# Patient Record
Sex: MALE | Race: WHITE | NOT HISPANIC OR LATINO | Employment: FULL TIME | URBAN - METROPOLITAN AREA
[De-identification: names, ages, dates, MRNs, and addresses within clinical notes are randomized per-mention and may not be internally consistent; named-entity substitution may affect disease eponyms.]

---

## 2021-01-18 ENCOUNTER — TELEPHONE (OUTPATIENT)
Dept: ORTHOPEDIC SURGERY | Facility: CLINIC | Age: 51
End: 2021-01-18

## 2021-01-18 NOTE — TELEPHONE ENCOUNTER
LEFT VOICEMAIL WITH PATIENT TO GET SCHEDULED ON A Friday WITH ELVIRA MAYA PER DR. BOCANEGRA. SHE HAS REVIEWED A LETTER FROM PATIENT'S PHYSICAL THERAPIST AND WOULD LIKE PATIENT TO COME IN FOR A NEW PATIENT APPOINTMENT.

## 2021-02-12 ENCOUNTER — OFFICE VISIT (OUTPATIENT)
Dept: ORTHOPEDIC SURGERY | Facility: CLINIC | Age: 51
End: 2021-02-12

## 2021-02-12 VITALS — BODY MASS INDEX: 30.69 KG/M2 | HEART RATE: 70 BPM | OXYGEN SATURATION: 99 % | WEIGHT: 214.4 LBS | HEIGHT: 70 IN

## 2021-02-12 DIAGNOSIS — M76.62 TENDONITIS, ACHILLES, LEFT: ICD-10-CM

## 2021-02-12 DIAGNOSIS — M79.672 LEFT FOOT PAIN: Primary | ICD-10-CM

## 2021-02-12 DIAGNOSIS — M92.62 HAGLUND'S DEFORMITY OF LEFT HEEL: ICD-10-CM

## 2021-02-12 PROCEDURE — 99204 OFFICE O/P NEW MOD 45 MIN: CPT | Performed by: PHYSICIAN ASSISTANT

## 2021-02-12 RX ORDER — DIPHENOXYLATE HYDROCHLORIDE AND ATROPINE SULFATE 2.5; .025 MG/1; MG/1
TABLET ORAL DAILY
COMMUNITY

## 2021-02-12 NOTE — PROGRESS NOTES
[facility] [place] [facility]opa[facility] Surgery Clinic Note    Subjective     Patient: Kurt Schilder  : 1970    Primary Care Provider: Roger Neil DO    Requesting Provider: As above    Pain of the Left Foot      History    Chief Complaint: Left Achilles pain    History of Present Illness: This is a very pleasant 50-year-old male presenting to discuss his left heel pain.  He reports the pain began in the spring 2020.  He has a large pump bump which is been there for years but prior did not cause him any pain.  He complains of pain with weightbearing and walking that is 5/10 and aching and dull.  He is a triathlete and is having difficulty and is unable to run at this time secondary to the pain.  He can swim and do the bike.  He tends to wear a hole through the back of his shoe over the pump bump at the insertion of the Achilles.  He has had no trauma or injury.  He was treated with physical therapy and anti-inflammatories.  He is a physical therapist himself.  He is here for further evaluation and treatment recommendations.    Current Outpatient Medications on File Prior to Visit   Medication Sig Dispense Refill   • Cholecalciferol (VITAMIN D-3 PO) Take  by mouth.     • CITRUS BERGAMOT PO Take  by mouth.     • Ferrous Sulfate (IRON PO) Take  by mouth.     • IBUPROFEN PO Take  by mouth.     • MAGNESIUM PO Take  by mouth.     • multivitamin (MULTIVITAMIN PO) Take  by mouth Daily.     • Red Yeast Rice Extract (RED YEAST RICE PO) Take  by mouth.       No current facility-administered medications on file prior to visit.       No Known Allergies   Past Medical History:   Diagnosis Date   • Anemia    • Asthma    • Headache    • Skin cancer, basal cell     forehead     Past Surgical History:   Procedure Laterality Date   • APPENDECTOMY     • MOHS SURGERY     • TESTICLE TORSION REPAIR       Family History   Problem Relation Age of Onset   • Arthritis Father    • Cancer Father       Social History  "    Socioeconomic History   • Marital status:      Spouse name: Not on file   • Number of children: Not on file   • Years of education: Not on file   • Highest education level: Not on file   Tobacco Use   • Smoking status: Never Smoker   • Smokeless tobacco: Never Used   Substance and Sexual Activity   • Alcohol use: Yes     Comment: 2-3 drinks yearly   • Drug use: Never   • Sexual activity: Defer        Review of Systems   Constitutional: Negative.    HENT: Negative.    Eyes: Negative.    Respiratory: Negative.    Cardiovascular: Negative.    Gastrointestinal: Negative.    Endocrine: Negative.    Genitourinary: Negative.    Musculoskeletal: Positive for arthralgias.   Skin: Negative.    Allergic/Immunologic: Negative.    Neurological: Negative.    Hematological: Negative.    Psychiatric/Behavioral: Negative.        The following portions of the patient's history were reviewed and updated as appropriate: allergies, current medications, past family history, past medical history, past social history, past surgical history and problem list.      Objective      Physical Exam  Pulse 70   Ht 178.3 cm (70.2\")   Wt 97.3 kg (214 lb 6.4 oz)   SpO2 99%   BMI 30.59 kg/m²     Body mass index is 30.59 kg/m².    GENERAL: Body habitus: obese    Lower extremity edema: Left: trace; Right: trace    Varicose veins:  Left: mild; Right: mild    Gait: normal     Mental Status:  awake and alert; oriented to person, place, and time    Voice:  clear  SKIN:  Normal    Hair Growth:  Right:normal; Left:  normal  HEENT: Head: Normocephalic, atraumatic,  without obvious abnormality.  eye: normal external eye, no icterus   PULM:  Repiratory effort normal    Ortho Exam   V:  Dorsalis Pedis:  Right: 2+; Left:2+    Posterior Tibial: Right:2+; Left:2+    Capillary Refill:  Brisk  MSK:      Tibia:  Right:  non tender; Left:  non tender      Ankle:  Right: non tender, ROM  normal and motor function  normal; Left:  Patient has a large pump " bump with no redness, warmth.  Nontender      Foot:  Right:  non tender; Left:  non tender      NEURO: Heel Walking:  Right:  normal; Left:  normal    Toe Walking:  Right:  normal; Left:  normal     W[place]all-Malia 5.07 monofilament test: not evaluated    Lower extremity sensation: intact     Calf Atrophy:none    Motor Function: all 5/5          Medical Decision Making    Data Review:   ordered and reviewed x-rays today    Assessment:  1. Left foot pain    2. Tendonitis, Achilles, left    3. Reuben's deformity of left heel        Plan:  Left achilles tendonitis with Reuben's deformity.  Reviewed today's x-rays and clinical findings past and current treatment the patient.  X-rays today show normal alignment, normal joint spaces, slight bulge over Achilles tendon about 4 cm above the insertion, no comparison.  It is difficult to tell if his pain is coming from tendinitis or from pressure over the deformity from a shoe.  Recommendation today is MRI of the left ankle for further evaluation.  He will return following the MRI for further treatment recommendations.    Patient history, diagnosis and treatment plan discussed with Dr. Kenny.        Roya Mckeon PA-C  02/17/21  12:49 EST

## 2021-02-27 ENCOUNTER — HOSPITAL ENCOUNTER (OUTPATIENT)
Dept: MRI IMAGING | Facility: HOSPITAL | Age: 51
Discharge: HOME | End: 2021-02-27
Admitting: PHYSICIAN ASSISTANT

## 2021-02-27 DIAGNOSIS — M79.672 LEFT FOOT PAIN: ICD-10-CM

## 2021-02-27 DIAGNOSIS — M76.62 TENDONITIS, ACHILLES, LEFT: ICD-10-CM

## 2021-02-27 PROCEDURE — 73721 MRI JNT OF LWR EXTRE W/O DYE: CPT

## 2021-03-12 ENCOUNTER — OFFICE VISIT (OUTPATIENT)
Dept: ORTHOPEDIC SURGERY | Facility: CLINIC | Age: 51
End: 2021-03-12

## 2021-03-12 VITALS
DIASTOLIC BLOOD PRESSURE: 81 MMHG | WEIGHT: 205.03 LBS | SYSTOLIC BLOOD PRESSURE: 138 MMHG | HEIGHT: 70 IN | HEART RATE: 55 BPM | BODY MASS INDEX: 29.35 KG/M2

## 2021-03-12 DIAGNOSIS — M92.62 HAGLUND'S DEFORMITY OF LEFT HEEL: Primary | ICD-10-CM

## 2021-03-12 DIAGNOSIS — M76.62 TENDONITIS, ACHILLES, LEFT: ICD-10-CM

## 2021-03-12 PROCEDURE — 99213 OFFICE O/P EST LOW 20 MIN: CPT | Performed by: PHYSICIAN ASSISTANT

## 2021-03-12 RX ORDER — METHYLPREDNISOLONE 4 MG/1
TABLET ORAL
Qty: 21 TABLET | Refills: 0 | Status: SHIPPED | OUTPATIENT
Start: 2021-03-12 | End: 2022-06-01

## 2021-03-12 NOTE — PROGRESS NOTES
[facility] [place] Orthopaedic Surgery Clinic Note    Subjective     Patient: Kurt Joseph Schilder  : 1970    Primary Care Provider: Roger Neil DO    Requesting Provider: As above    Follow-up (MRI Left ankle 21)      History    Chief Complaint: Follow-up MRI left ankle    History of Present Illness: Patient returns today for follow-up visit left ankle MRI.  He has gotten clogs which seem to help some.  However, he is not a fan of them.  He has continued with his low impact exercise has not returned to running.    Current Outpatient Medications on File Prior to Visit   Medication Sig Dispense Refill   • Cholecalciferol (VITAMIN D-3 PO) Take  by mouth.     • CITRUS BERGAMOT PO Take  by mouth.     • Ferrous Sulfate (IRON PO) Take  by mouth.     • IBUPROFEN PO Take  by mouth.     • multivitamin (MULTIVITAMIN PO) Take  by mouth Daily.     • Red Yeast Rice Extract (RED YEAST RICE PO) Take  by mouth.       No current facility-administered medications on file prior to visit.      No Known Allergies   Past Medical History:   Diagnosis Date   • Anemia    • Asthma    • Headache    • Skin cancer, basal cell     forehead     Past Surgical History:   Procedure Laterality Date   • APPENDECTOMY     • MOHS SURGERY     • TESTICLE TORSION REPAIR       Family History   Problem Relation Age of Onset   • Arthritis Father    • Cancer Father       Social History     Socioeconomic History   • Marital status:      Spouse name: Not on file   • Number of children: Not on file   • Years of education: Not on file   • Highest education level: Not on file   Tobacco Use   • Smoking status: Never Smoker   • Smokeless tobacco: Never Used   Substance and Sexual Activity   • Alcohol use: Yes     Comment: 2-3 drinks yearly   • Drug use: Never   • Sexual activity: Defer        Review of Systems   Constitutional: Negative.    HENT: Negative.    Eyes: Negative.    Respiratory: Negative.    Cardiovascular: Negative.   "  Gastrointestinal: Negative.    Endocrine: Negative.    Genitourinary: Negative.    Musculoskeletal: Positive for arthralgias.   Skin: Negative.    Allergic/Immunologic: Negative.    Neurological: Negative.    Hematological: Negative.    Psychiatric/Behavioral: Negative.        The following portions of the patient's history were reviewed and updated as appropriate: allergies, current medications, past family history, past medical history, past social history, past surgical history and problem list.      Objective      Physical Exam  /81   Pulse 55   Ht 177.8 cm (70\")   Wt 93 kg (205 lb 0.4 oz)   BMI 29.42 kg/m²     Body mass index is 29.42 kg/m².    Patient is well developed, well nourished and in no acute distress.  Alert and oriented x 3.    Ortho Exam  Left ankle exam: Nontender to palpation over prominent pump bump.  Normal range of motion and strength.  Neurovascular intact.  Normal gait.    Medical Decision Making    Data Review:   reviewed radiology images    Assessment:  1. Reuben's deformity of left heel    2. Tendonitis, Achilles, left        Plan:  Mild left Achilles tendinitis with Reuben's deformity.  I reviewed MRI from 2/27/2021 and clinical findings past and current treatment the patient.  MRI shows no significant tendinitis with a small amount of fluid at the retrocalcaneal bursa.  We discussed continued treatment options including continued stretching, open back shoes.  He is a physical therapist himself.  I do not see any strong indication for putting him in a cast for 6 weeks.  He would rather not do that as well.  He will slowly return to activity when comfortable.  Return as needed.    History, diagnosis and treatment plan discussed with Dr. Arteaga.          Roya Mckeon PA-C  03/16/21  14:10 EDT    "

## 2022-06-01 ENCOUNTER — OFFICE VISIT (OUTPATIENT)
Dept: CARDIOLOGY | Facility: CLINIC | Age: 52
End: 2022-06-01

## 2022-06-01 VITALS
HEIGHT: 70 IN | BODY MASS INDEX: 30.92 KG/M2 | OXYGEN SATURATION: 99 % | DIASTOLIC BLOOD PRESSURE: 82 MMHG | HEART RATE: 55 BPM | SYSTOLIC BLOOD PRESSURE: 150 MMHG | WEIGHT: 216 LBS

## 2022-06-01 DIAGNOSIS — R00.1 BRADYCARDIA, SINUS: Primary | ICD-10-CM

## 2022-06-01 PROCEDURE — 99203 OFFICE O/P NEW LOW 30 MIN: CPT | Performed by: INTERNAL MEDICINE

## 2022-06-01 PROCEDURE — 93000 ELECTROCARDIOGRAM COMPLETE: CPT | Performed by: INTERNAL MEDICINE

## 2022-06-01 RX ORDER — FEXOFENADINE HCL 180 MG/1
180 TABLET ORAL DAILY PRN
COMMUNITY

## 2022-06-01 RX ORDER — FLUTICASONE PROPIONATE 50 MCG
1 SPRAY, SUSPENSION (ML) NASAL DAILY
COMMUNITY

## 2022-06-01 NOTE — PROGRESS NOTES
"Subjective:     Encounter Date:06/01/2022    Primary Care Physician: Roger Neil DO      Patient ID: Kurt Joseph Schilder is a 52 y.o. male.    Chief Complaint:Slow Heart Rate    PROBLEM LIST:  1. Bradycardia  2. Dyslipidemia  3. Basal cell cancer with removal  4. History of nephrolithiasis.   5. Celiac disease  6. History of iron deficiency  7. Surgeries:  a. Skin cancer removal  b. Appendectomy  c. Surgery for testicular torsion 1974      No Known Allergies      Current Outpatient Medications:   •  Cholecalciferol (VITAMIN D-3 PO), Take  by mouth., Disp: , Rfl:   •  CITRUS BERGAMOT PO, Take  by mouth., Disp: , Rfl:   •  Ferrous Sulfate (IRON PO), Take  by mouth., Disp: , Rfl:   •  fexofenadine (ALLEGRA) 180 MG tablet, Take 180 mg by mouth Daily As Needed., Disp: , Rfl:   •  fluticasone (FLONASE) 50 MCG/ACT nasal spray, 1 spray into the nostril(s) as directed by provider Daily., Disp: , Rfl:   •  IBUPROFEN PO, Take  by mouth., Disp: , Rfl:   •  multivitamin (THERAGRAN) tablet tablet, Take  by mouth Daily., Disp: , Rfl:         History of Present Illness    Patient is a 52-year-old  male who we are seeing today for further evaluation of bradycardia.  Patient has no previous history of any cardiac issues.  Has not had a previous cardiac evaluation.  Patient notes that he has been having fatigue for \"years\".  He is underwent sleep study which was negative.  Was noted to be iron deficient at 1 point in time but then was noted to have celiac disease.  Since avoiding gluten this has improved.  Patient has noted recently his resting heart rate has been in the 40s with an average of 47.  And given his general fatigue he wanted to make sure that everything was \"okay\".  Has some occasional musculoskeletal left-sided chest pain.  No anginal symptoms.  He is overall very active and runs 3-4 times weekly as well as weight training.  No reported anginal symptoms.  Has some occasional palpitations maybe " "twice weekly that are described as brief racing sensations.  Does not note any associated tachycardia by his watch.    The following portions of the patient's history were reviewed and updated as appropriate: allergies, current medications, past family history, past medical history, past social history, past surgical history and problem list.    Family History   Problem Relation Age of Onset   • Hypertension Mother    • Arthritis Father    • Cancer Father        Social History     Tobacco Use   • Smoking status: Never Smoker   • Smokeless tobacco: Never Used   Substance Use Topics   • Alcohol use: Yes     Comment: 2-3 drinks yearly   • Drug use: Never         Review of Systems   Constitutional: Positive for malaise/fatigue. Negative for fever.   HENT: Positive for tinnitus. Negative for nosebleeds.    Eyes: Negative for redness and visual disturbance.   Cardiovascular: Positive for palpitations. Negative for orthopnea and paroxysmal nocturnal dyspnea.   Respiratory: Negative for cough, snoring, sputum production and wheezing.    Hematologic/Lymphatic: Negative for bleeding problem.   Skin: Negative for flushing, itching and rash.   Musculoskeletal: Negative for falls, joint pain and muscle cramps.   Gastrointestinal: Negative for abdominal pain, diarrhea, heartburn, nausea and vomiting.   Genitourinary: Negative for hematuria.   Neurological: Positive for excessive daytime sleepiness and headaches. Negative for dizziness, tremors and weakness.   Psychiatric/Behavioral: Negative for substance abuse. The patient is not nervous/anxious.           Objective:   /82   Pulse 55   Ht 177.8 cm (70\")   Wt 98 kg (216 lb)   SpO2 99%   BMI 30.99 kg/m²         Vitals reviewed.   Constitutional:       Appearance: Healthy appearance. Well-developed and not in distress.   Eyes:      Conjunctiva/sclera: Conjunctivae normal.      Pupils: Pupils are equal, round, and reactive to light.   HENT:      Head: Normocephalic and " atraumatic.    Mouth/Throat:      Pharynx: Oropharynx is clear.   Neck:      Thyroid: Thyroid normal. No thyromegaly.      Vascular: Normal carotid pulses. No carotid bruit or JVD. JVD normal.      Lymphadenopathy: No cervical adenopathy.   Pulmonary:      Effort: No respiratory distress.      Breath sounds: No wheezing. No rales.   Chest:      Chest wall: Not tender to palpatation.   Cardiovascular:      Normal rate. Regular rhythm.      No gallop.   Pulses:     Carotid: 2+ bilaterally.     Dorsalis pedis: 2+ bilaterally.     Posterior tibial: 2+ bilaterally.  Abdominal:      General: There is no distension or abdominal bruit.      Palpations: There is no abdominal mass.      Tenderness: There is no abdominal tenderness. There is no rebound.   Musculoskeletal:         General: No tenderness or deformity.      Extremities: No clubbing present.Skin:     General: Skin is warm and dry. There is no cyanosis.      Findings: No rash.   Neurological:      Mental Status: Alert, oriented to person, place, and time and oriented to person, place and time.           ECG 12 Lead    Date/Time: 6/1/2022 3:53 PM  Performed by: Timbo Case MD  Authorized by: Timbo Case MD   Comparison: compared with previous ECG from 4/1/2022  Similar to previous ECG  Rhythm: sinus rhythm  Conduction: right bundle branch block and 1st degree AV block    Clinical impression: abnormal EKG                  Assessment:   Assessment & Plan      Diagnoses and all orders for this visit:    1. Bradycardia, sinus (Primary)    Other orders  -     ECG 12 Lead      1.  Sinus bradycardia.  No evidence of chronotropic incompetence, patient very active and heart rate increases appropriately with exercise.  2.  Dizziness/orthostasis.  Not related to the above.  Malaise fatigue not related to the above.    Recommendations:    1.  Discussed patient, he has no evidence of sinus node dysfunction.  He does have resting bradycardia likely due to being  "\"fit\".  2.  Continue current physical activity.  3.  Reassurance.  No current indications for pacing.  4.  Revisit as needed       Jocelyne SHANE scribed portions of this dictation for Dr. Timbo Case.     I have seen and examined the patient, I have reviewed the note, discussed the case with the advance practice clinician, made necessary changes and I agree with the final note.    Timbo Case MD  06/01/22  15:56 EDT        Dictated utilizing Dragon dictation  "

## 2024-06-04 ENCOUNTER — TELEPHONE (OUTPATIENT)
Dept: CARDIOLOGY | Facility: CLINIC | Age: 54
End: 2024-06-04
Payer: COMMERCIAL

## 2024-06-04 NOTE — TELEPHONE ENCOUNTER
Caller: Schilder, Kurt Joseph    Relationship to patient: Self    Best call back number: [phone]    Chief complaint: WOULD LIKE TO GET IN FOR ROUTINE FOLLOW UP IN [place]    Type of visit: YEARLY FU    Requested date: NEXT AVAILABLE     If rescheduling, when is the original appointment: N/A

## 2024-06-25 NOTE — PROGRESS NOTES
Subjective:     Encounter Date:06/26/2024    Primary Care Physician: Roger Neil DO      Patient ID: Kurt Joseph Schilder is a 54 y.o. male.    Chief Complaint:Establish Care (Low heart rate)    PROBLEM LIST:  Bradycardia  Dyslipidemia  Basal cell cancer with removal  History of nephrolithiasis.   Celiac disease  History of iron deficiency  Surgeries:  Skin cancer removal  Appendectomy  Surgery for testicular torsion 1974        No Known Allergies      Current Outpatient Medications:     Cholecalciferol (VITAMIN D-3 PO), Take  by mouth., Disp: , Rfl:     Coenzyme Q10 (CoQ-10) 100 MG capsule, 1 capsule., Disp: , Rfl:     Ferrous Sulfate (IRON PO), Take  by mouth., Disp: , Rfl:     IBUPROFEN PO, Take  by mouth., Disp: , Rfl:     Omega-3 Fatty Acids (fish oil) 1200 MG capsule capsule, Take 1 capsule by mouth Daily With Breakfast., Disp: , Rfl:     vitamin B-12 (CYANOCOBALAMIN) 1000 MCG tablet, Take 1 tablet by mouth Daily., Disp: , Rfl:         History of Present Illness    Patient is a 54-year-old  male who presents today for further evaluation of bradycardia.  Patient has previous history of this with previous evaluation.  However, over the last few months he notes that his fatigue has seemed to be worsening.  He has also been having some intermittent palpitations over the course of the last 1.5 months.  Patient wears a smart watch and has at times been alerted that his heart rate has dropped into the 30s during the day.  This does not always occur with symptoms.  Patient also notes that at 1 point in time his watch alerted him to an irregular heartbeat.  Given his symptoms he wanted to be evaluated.  Is relatively active and runs occasionally.  Also does a routine stretching regimen.  Notes that he will have some occasional dizziness at rest.  Otherwise some positional dizziness.  No true loss of consciousness.  Patient is also been experiencing some intermittent lower extremity edema  "throughout the day.    The following portions of the patient's history were reviewed and updated as appropriate: allergies, current medications, past family history, past medical history, past social history, past surgical history and problem list.    Family History   Problem Relation Age of Onset    Hypertension Mother     Arthritis Father     Cancer Father        Social History     Tobacco Use    Smoking status: Never    Smokeless tobacco: Never   Substance Use Topics    Alcohol use: Yes     Comment: 2-3 drinks yearly    Drug use: Never         Review of Systems   Constitutional: Positive for malaise/fatigue. Negative for fever.   HENT:  Negative for nosebleeds.    Eyes:  Negative for redness and visual disturbance.   Cardiovascular:  Positive for palpitations. Negative for orthopnea and paroxysmal nocturnal dyspnea.   Respiratory:  Negative for cough, snoring, sputum production and wheezing.    Hematologic/Lymphatic: Negative for bleeding problem.   Skin:  Negative for flushing, itching and rash.   Musculoskeletal:  Negative for falls, joint pain and muscle cramps.   Gastrointestinal:  Negative for abdominal pain, diarrhea, heartburn, nausea and vomiting.   Genitourinary:  Negative for hematuria.   Neurological:  Positive for dizziness. Negative for excessive daytime sleepiness, headaches, tremors and weakness.   Psychiatric/Behavioral:  Negative for substance abuse. The patient is not nervous/anxious.           Objective:   /89   Pulse 56   Ht 177.8 cm (70\")   Wt 100 kg (220 lb 6.4 oz)   SpO2 98%   BMI 31.62 kg/m²         Vitals reviewed.   Constitutional:       Appearance: Healthy appearance. Well-developed and not in distress.   Eyes:      Conjunctiva/sclera: Conjunctivae normal.      Pupils: Pupils are equal, round, and reactive to light.   HENT:      Head: Normocephalic and atraumatic.    Mouth/Throat:      Pharynx: Oropharynx is clear.   Neck:      Thyroid: Thyroid normal. No thyromegaly.      " Vascular: Normal carotid pulses. No carotid bruit or JVD. JVD normal.      Lymphadenopathy: No cervical adenopathy.   Pulmonary:      Effort: No respiratory distress.      Breath sounds: No wheezing. No rales.   Chest:      Chest wall: Not tender to palpatation.   Cardiovascular:      Normal rate. Regular rhythm.      No gallop.    Pulses:     Carotid: 2+ bilaterally.     Dorsalis pedis: 2+ bilaterally.     Posterior tibial: 2+ bilaterally.  Edema:     Peripheral edema absent.   Abdominal:      General: There is no distension or abdominal bruit.      Palpations: There is no abdominal mass.      Tenderness: There is no abdominal tenderness. There is no rebound.   Musculoskeletal:         General: No tenderness or deformity.      Extremities: No clubbing present.Skin:     General: Skin is warm and dry. There is no cyanosis.      Findings: No rash.   Neurological:      Mental Status: Alert, oriented to person, place, and time and oriented to person, place and time.           ECG 12 Lead    Date/Time: 6/26/2024 2:00 PM  Performed by: Timbo Case MD    Authorized by: Timbo Case MD  Comparison: compared with previous ECG from 6/1/2022  Similar to previous ECG  Rhythm: sinus bradycardia  Conduction: 1st degree AV block                Assessment:   Assessment & Plan      Diagnoses and all orders for this visit:    1. Bradycardia, sinus (Primary)  -     ECG 12 Lead      1.  Bradycardia intermittent  2.  Malaise fatigue unclear if related to the above  3.  Dyslipidemia    Recommendations:  1.  Check laboratory studies given new onset malaise fatigue with TSH vitamin D testosterone CMP and CBC.  2.  30-day event monitor to evaluate bradycardia Irving on watch.  3.  Echocardiogram  4.  Further recommendations after the above       Jocelyne SHANE scribed portions of this dictation for Dr. Timbo Case      I have seen and examined the patient, I have reviewed the note, discussed the case with the advance  practice clinician, made necessary changes and I agree with the final note.    Timbo Case MD  06/26/24  15:31 EDT      Dictated utilizing Dragon dictation

## 2024-06-26 ENCOUNTER — OFFICE VISIT (OUTPATIENT)
Dept: CARDIOLOGY | Facility: CLINIC | Age: 54
End: 2024-06-26
Payer: COMMERCIAL

## 2024-06-26 VITALS
HEART RATE: 56 BPM | OXYGEN SATURATION: 98 % | BODY MASS INDEX: 31.55 KG/M2 | HEIGHT: 70 IN | DIASTOLIC BLOOD PRESSURE: 89 MMHG | WEIGHT: 220.4 LBS | SYSTOLIC BLOOD PRESSURE: 145 MMHG

## 2024-06-26 DIAGNOSIS — R00.1 BRADYCARDIA, SINUS: Primary | ICD-10-CM

## 2024-06-26 PROCEDURE — 99214 OFFICE O/P EST MOD 30 MIN: CPT | Performed by: INTERNAL MEDICINE

## 2024-06-26 PROCEDURE — 93000 ELECTROCARDIOGRAM COMPLETE: CPT | Performed by: INTERNAL MEDICINE

## 2024-06-26 RX ORDER — LANOLIN ALCOHOL/MO/W.PET/CERES
1000 CREAM (GRAM) TOPICAL DAILY
COMMUNITY
Start: 2024-04-15

## 2024-06-26 RX ORDER — AMOXICILLIN 500 MG
1200 CAPSULE ORAL
COMMUNITY
Start: 2024-04-15

## 2024-07-15 ENCOUNTER — HOSPITAL ENCOUNTER (OUTPATIENT)
Dept: CARDIOLOGY | Facility: HOSPITAL | Age: 54
Discharge: HOME | End: 2024-07-15
Admitting: INTERNAL MEDICINE
Payer: COMMERCIAL

## 2024-07-15 DIAGNOSIS — R00.1 BRADYCARDIA, SINUS: ICD-10-CM

## 2024-07-15 LAB
BH CV ECHO MEAS - AO MAX PG: 9.1 MMHG
BH CV ECHO MEAS - AO ROOT DIAM: 2.8 CM
BH CV ECHO MEAS - AO V2 MAX: 150.6 CM/SEC
BH CV ECHO MEAS - EF(MOD-BP): 62.8 %
BH CV ECHO MEAS - IVS/LVPW: 1.08 CM
BH CV ECHO MEAS - IVSD: 1.3 CM
BH CV ECHO MEAS - LA DIMENSION: 3.8 CM
BH CV ECHO MEAS - LV MAX PG: 4.4 MMHG
BH CV ECHO MEAS - LV MEAN PG: 2 MMHG
BH CV ECHO MEAS - LV V1 MAX: 104.4 CM/SEC
BH CV ECHO MEAS - LV V1 VTI: 24 CM
BH CV ECHO MEAS - LVIDD: 3.3 CM
BH CV ECHO MEAS - LVIDS: 2.3 CM
BH CV ECHO MEAS - LVOT DIAM: 2 CM
BH CV ECHO MEAS - LVPWD: 1.2 CM
BH CV ECHO MEAS - MV A MAX VEL: 57.7 CM/SEC
BH CV ECHO MEAS - MV DEC SLOPE: 440.2 CM/SEC2
BH CV ECHO MEAS - MV DEC TIME: 89.5 SEC
BH CV ECHO MEAS - MV E MAX VEL: 90 CM/SEC
BH CV ECHO MEAS - MV E/A: 1.56
BH CV ECHO MEAS - MV MAX PG: 3.2 MMHG
BH CV ECHO MEAS - MV MEAN PG: 1.1 MMHG
BH CV ECHO MEAS - MV P1/2T: 63 MSEC
BH CV ECHO MEAS - MV V2 VTI: 28.9 CM
BH CV ECHO MEAS - MVA(P1/2T): 3.5 CM2
BH CV ECHO MEAS - PA ACC TIME: 0.07 SEC
BH CV ECHO MEAS - TAPSE (>1.6): 2.37 CM
BH CV XLRA - RV BASE: 4.2 CM
BH CV XLRA - RV LENGTH: 7.68 CM
BH CV XLRA - RV MID: 3.6 CM
BH CV XLRA - TDI S': 10 CM/SEC
LEFT ATRIUM VOLUME INDEX: 29.9 ML/M2

## 2024-07-15 PROCEDURE — 93306 TTE W/DOPPLER COMPLETE: CPT

## 2024-07-23 LAB
BH CV ECHO MEAS - AO MAX PG: 9.1 MMHG
BH CV ECHO MEAS - AO ROOT DIAM: 2.8 CM
BH CV ECHO MEAS - AO V2 MAX: 150.6 CM/SEC
BH CV ECHO MEAS - EF(MOD-BP): 62.8 %
BH CV ECHO MEAS - IVS/LVPW: 1.08 CM
BH CV ECHO MEAS - IVSD: 1.3 CM
BH CV ECHO MEAS - LA DIMENSION: 3.8 CM
BH CV ECHO MEAS - LV MAX PG: 4.4 MMHG
BH CV ECHO MEAS - LV MEAN PG: 2 MMHG
BH CV ECHO MEAS - LV V1 MAX: 104.4 CM/SEC
BH CV ECHO MEAS - LV V1 VTI: 24 CM
BH CV ECHO MEAS - LVIDD: 3.3 CM
BH CV ECHO MEAS - LVIDS: 2.3 CM
BH CV ECHO MEAS - LVOT DIAM: 2 CM
BH CV ECHO MEAS - LVPWD: 1.2 CM
BH CV ECHO MEAS - MV A MAX VEL: 57.7 CM/SEC
BH CV ECHO MEAS - MV DEC SLOPE: 440.2 CM/SEC2
BH CV ECHO MEAS - MV DEC TIME: 89.5 SEC
BH CV ECHO MEAS - MV E MAX VEL: 90 CM/SEC
BH CV ECHO MEAS - MV E/A: 1.56
BH CV ECHO MEAS - MV MAX PG: 3.2 MMHG
BH CV ECHO MEAS - MV MEAN PG: 1.1 MMHG
BH CV ECHO MEAS - MV P1/2T: 63 MSEC
BH CV ECHO MEAS - MV V2 VTI: 28.9 CM
BH CV ECHO MEAS - MVA(P1/2T): 3.5 CM2
BH CV ECHO MEAS - PA ACC TIME: 0.07 SEC
BH CV ECHO MEAS - TAPSE (>1.6): 2.37 CM
BH CV XLRA - RV BASE: 4.2 CM
BH CV XLRA - RV LENGTH: 7.68 CM
BH CV XLRA - RV MID: 3.6 CM
BH CV XLRA - TDI S': 10 CM/SEC
LEFT ATRIUM VOLUME INDEX: 29.9 ML/M2
LV EF 2D ECHO EST: 65 %

## 2024-09-30 NOTE — PROGRESS NOTES
"Subjective:     Encounter Date:10/01/2024    Primary Care Physician: Roger Neil DO      Patient ID: Kurt Joseph Schilder is a 54 y.o. male.    Chief Complaint:Slow Heart Rate    PROBLEM LIST:  Bradycardia  7/15/2024 echo EF 65%, normal valves.  8/2024 normal monitor.  Dyslipidemia  Basal cell cancer with removal  History of nephrolithiasis.   Celiac disease  History of iron deficiency  Surgeries:  Skin cancer removal  Appendectomy  Surgery for testicular torsion 1974      No Known Allergies      Current Outpatient Medications:     Cholecalciferol (VITAMIN D-3 PO), Take  by mouth., Disp: , Rfl:     Coenzyme Q10 (CoQ-10) 100 MG capsule, 1 capsule., Disp: , Rfl:     Ferrous Sulfate (IRON PO), Take  by mouth., Disp: , Rfl:     IBUPROFEN PO, Take  by mouth., Disp: , Rfl:     Omega-3 Fatty Acids (fish oil) 1200 MG capsule capsule, Take 1 capsule by mouth Daily With Breakfast., Disp: , Rfl:     vitamin B-12 (CYANOCOBALAMIN) 1000 MCG tablet, Take 1 tablet by mouth Daily., Disp: , Rfl:         History of Present Illness    Patient returns today for follow-up post cardiovascular testing for bradycardia and fatigue.  Patient's labs, echo, 30-day monitor returned with normal results.  Patient overall notes feeling well.  No further dizziness.  Primary complaint is persistent fatigue.  He is going to follow with his primary care physician for this.  No current CV complaints.    The following portions of the patient's history were reviewed and updated as appropriate: allergies, current medications, past family history, past medical history, past social history, past surgical history and problem list.      Social History     Tobacco Use    Smoking status: Never    Smokeless tobacco: Never   Substance Use Topics    Alcohol use: Yes     Comment: 2-3 drinks yearly    Drug use: Never         ROS       Objective:   /87   Pulse 57   Ht 177.8 cm (70\")   Wt 94.1 kg (207 lb 6.4 oz)   SpO2 96%   BMI 29.76 kg/m² "         Vitals reviewed.   Constitutional:       Appearance: Well-developed and not in distress.   Neck:      Vascular: No JVD.      Trachea: No tracheal deviation.   Pulmonary:      Effort: Pulmonary effort is normal.      Breath sounds: Normal breath sounds.   Cardiovascular:      Normal rate. Regular rhythm.      Murmurs: There is no murmur.   Edema:     Peripheral edema absent.   Musculoskeletal:         General: No deformity. Skin:     General: Skin is warm and dry.   Neurological:      Mental Status: Alert and oriented to person, place, and time.         Procedures          Assessment:   Assessment & Plan      Diagnoses and all orders for this visit:    1. Bradycardia, sinus (Primary), stable.  Recent monitor with normal results.  No significant bradycardia noted.    2. Malaise and fatigue, prolonged issue.  No current suspicion for cardiac source.  Echocardiogram normal.      Plan:  Reviewed test results with patient in the office today.  Patient overall stable from cardiac standpoint.  Will see again on an as-needed basis.       Jocelyne SHANE             Dictated utilizing Dragon dictation

## 2024-10-01 ENCOUNTER — OFFICE VISIT (OUTPATIENT)
Dept: CARDIOLOGY | Facility: CLINIC | Age: 54
End: 2024-10-01
Payer: COMMERCIAL

## 2024-10-01 VITALS
OXYGEN SATURATION: 96 % | SYSTOLIC BLOOD PRESSURE: 130 MMHG | DIASTOLIC BLOOD PRESSURE: 87 MMHG | WEIGHT: 207.4 LBS | BODY MASS INDEX: 29.69 KG/M2 | HEIGHT: 70 IN | HEART RATE: 57 BPM

## 2024-10-01 DIAGNOSIS — R53.83 MALAISE AND FATIGUE: ICD-10-CM

## 2024-10-01 DIAGNOSIS — R53.81 MALAISE AND FATIGUE: ICD-10-CM

## 2024-10-01 DIAGNOSIS — R00.1 BRADYCARDIA, SINUS: Primary | ICD-10-CM

## 2024-10-01 PROCEDURE — 99212 OFFICE O/P EST SF 10 MIN: CPT | Performed by: NURSE PRACTITIONER

## 2024-10-01 NOTE — LETTER
October 1, 2024       No Recipients    Patient: Kurt Joseph Schilder   YOB: 1970   Date of Visit: 10/1/2024     Dear Roger Neil DO:       Thank you for referring Kurt Schilder to me for evaluation. Below are the relevant portions of my assessment and plan of care.    If you have questions, please do not hesitate to call me. I look forward to following Stanislav along with you.         Sincerely,        SVITLANA Hardy        CC:   No Recipients    Jocelyne Patricio APRN  10/01/24 1510  Sign when Signing Visit  Subjective:     Encounter Date:10/01/2024    Primary Care Physician: Roger Neil DO      Patient ID: Kurt Joseph Schilder is a 54 y.o. male.    Chief Complaint:Slow Heart Rate    PROBLEM LIST:  Bradycardia  7/15/2024 echo EF 65%, normal valves.  8/2024 normal monitor.  Dyslipidemia  Basal cell cancer with removal  History of nephrolithiasis.   Celiac disease  History of iron deficiency  Surgeries:  Skin cancer removal  Appendectomy  Surgery for testicular torsion 1974      No Known Allergies      Current Outpatient Medications:   •  Cholecalciferol (VITAMIN D-3 PO), Take  by mouth., Disp: , Rfl:   •  Coenzyme Q10 (CoQ-10) 100 MG capsule, 1 capsule., Disp: , Rfl:   •  Ferrous Sulfate (IRON PO), Take  by mouth., Disp: , Rfl:   •  IBUPROFEN PO, Take  by mouth., Disp: , Rfl:   •  Omega-3 Fatty Acids (fish oil) 1200 MG capsule capsule, Take 1 capsule by mouth Daily With Breakfast., Disp: , Rfl:   •  vitamin B-12 (CYANOCOBALAMIN) 1000 MCG tablet, Take 1 tablet by mouth Daily., Disp: , Rfl:         History of Present Illness    Patient returns today for follow-up post cardiovascular testing for bradycardia and fatigue.  Patient's labs, echo, 30-day monitor returned with normal results.  Patient overall notes feeling well.  No further dizziness.  Primary complaint is persistent fatigue.  He is going to follow with his primary care physician for this.  No current CV  "complaints.    The following portions of the patient's history were reviewed and updated as appropriate: allergies, current medications, past family history, past medical history, past social history, past surgical history and problem list.      Social History     Tobacco Use   • Smoking status: Never   • Smokeless tobacco: Never   Substance Use Topics   • Alcohol use: Yes     Comment: 2-3 drinks yearly   • Drug use: Never         ROS       Objective:   /87   Pulse 57   Ht 177.8 cm (70\")   Wt 94.1 kg (207 lb 6.4 oz)   SpO2 96%   BMI 29.76 kg/m²         Vitals reviewed.   Constitutional:       Appearance: Well-developed and not in distress.   Neck:      Vascular: No JVD.      Trachea: No tracheal deviation.   Pulmonary:      Effort: Pulmonary effort is normal.      Breath sounds: Normal breath sounds.   Cardiovascular:      Normal rate. Regular rhythm.      Murmurs: There is no murmur.   Edema:     Peripheral edema absent.   Musculoskeletal:         General: No deformity. Skin:     General: Skin is warm and dry.   Neurological:      Mental Status: Alert and oriented to person, place, and time.         Procedures          Assessment:   Assessment & Plan     Diagnoses and all orders for this visit:    1. Bradycardia, sinus (Primary), stable.  Recent monitor with normal results.  No significant bradycardia noted.    2. Malaise and fatigue, prolonged issue.  No current suspicion for cardiac source.  Echocardiogram normal.      Plan:  Reviewed test results with patient in the office today.  Patient overall stable from cardiac standpoint.  Will see again on an as-needed basis.       Jocelyne SHANE             Dictated utilizing Dragon dictation  "